# Patient Record
(demographics unavailable — no encounter records)

---

## 2025-07-02 NOTE — PROCEDURE
[Other: ____] : [unfilled] [Right] : of the right [Pain] : pain [Inflammation] : inflammation [Alcohol] : alcohol [Betadine] : betadine [Ethyl Chloride sprayed topically] : ethyl chloride sprayed topically [Effusion] : effusion [] : Patient tolerated procedure well [Call if redness, pain or fever occur] : call if redness, pain or fever occur [Apply ice for 15min out of every hour for the next 12-24 hours as tolerated] : apply ice for 15 minutes out of every hour for the next 12-24 hours as tolerated [Previous OTC use and PT nontherapeutic] : patient has tried OTC's including aspirin, Ibuprofen, Aleve, etc or prescription NSAIDS, and/or exercises at home and/or physical therapy without satisfactory response [Patient had decreased mobility in the joint] : patient had decreased mobility in the joint [Risks, benefits, alternatives discussed / Verbal consent obtained] : the risks benefits, and alternatives have been discussed, and verbal consent was obtained [de-identified] : 2ccs [de-identified] : blood

## 2025-07-02 NOTE — DISCUSSION/SUMMARY
[de-identified] : We discussed compression, avoiding leaning on the elbow, ice therapy and the role of NSAIDS for the pain. These modalities will improve the patient's functionality in their activities of daily life.  Risks, benefits and contraindications were discussed.  The patient will follow up in 4 weeks or sooner as needed.

## 2025-07-02 NOTE — HISTORY OF PRESENT ILLNESS
[de-identified] : Patient reports bursitis for the last 6 months that he has been treating with compression and Arnica.

## 2025-07-02 NOTE — PHYSICAL EXAM
[Normal Coordination] : normal coordination [Normal Sensation] : normal sensation [Normal Mood and Affect] : normal mood and affect [Oriented] : oriented [Able to Communicate] : able to communicate [Well Developed] : well developed [Well Nourished] : well nourished [NL (150)] : flexion 150 degrees [NL (0)] : extension 0 degrees [NL (90)] : supination 90 degrees [5___] : supination 5[unfilled]/5 [] : light touch intact [Right] : right elbow [Degenerative change] : Degenerative change [FreeTextEntry1] : olecranon spur

## 2025-07-30 NOTE — HISTORY OF PRESENT ILLNESS
[FreeTextEntry1] : 54 year-old male here for cv evaluation I initally saw in 2021 for primary prevention and HTN work up. He is overweight with mild RUSSELL.   8/2025 VISIT: refills needed. interim coronary calcium score 19  4/2024 VISIT: LDL stable 129. hypertension improved with weight loss and increased activity.   4/2023 VISIT: no angina. bp elevated. Labwork cbc/tft/ldl 124/a1c/cmp. TTE: preserved   11/2022 VISIT: blood pressures now increased but reports could be due to gout flare and not taking hydralazine as prescribed. mild weight gain. diet.   3/2022 VISIT:  2ndary evaluation work up was unremarkable as detailed. Treadmill stress test was stopped prematurely due to hypertensive response  .  Submaximal heart rate unable to interpret for ischemia.  He did not have symptoms. Blood pressure log kept at home with current medications show resting pressure average about 140 and diastolic in 90s.   **TESTING I REVIEWED TODAY excluding above:  4/2023 VISIT: Labwork cbc/tft/ldl 124.  3/2022 ETT: hypertensive response. sub max HR LABWORK: normal tft/cbc/cmp/a1c   12/2021 CAROTID DUPLEX: NO SIG DISEASE  Echocardiogram as an inpatient on May 17/21 showed preserved biventricular function with mild concentric left ventricular hypertrophy. No renal artery stenosis No adrenal mass Lab work 2/26/2021: Significant for grossly normal CBC, CMP.  .  ASCVD 5% 10-year. no renin/jose.

## 2025-07-30 NOTE — PHYSICAL EXAM
[Well Developed] : well developed [Well Nourished] : well nourished [No Acute Distress] : no acute distress [Normal Conjunctiva] : normal conjunctiva [Normal Venous Pressure] : normal venous pressure [Normal S1, S2] : normal S1, S2 [No Rub] : no rub [No Gallop] : no gallop [Clear Lung Fields] : clear lung fields [Good Air Entry] : good air entry [No Respiratory Distress] : no respiratory distress  [Soft] : abdomen soft [Normal Bowel Sounds] : normal bowel sounds [Tenderness] : tenderness [Normal Gait] : normal gait [No Edema] : no edema [No Cyanosis] : no cyanosis [No Clubbing] : no clubbing [No Varicosities] : no varicosities [No Rash] : no rash [No Skin Lesions] : no skin lesions [Moves all extremities] : moves all extremities [No Focal Deficits] : no focal deficits [Normal Speech] : normal speech [Alert and Oriented] : alert and oriented [Normal memory] : normal memory [de-identified] : 2+ pulses

## 2025-07-30 NOTE — DISCUSSION/SUMMARY
[FreeTextEntry1] : 54 year-old male here for cv evaluation I initally saw in 2021 for primary prevention and HTN work up. He is overweight with mild RUSSELL.   He has a moderate 10 year ASCVD 5% and mild coronary calcium score of 19.   LPa will help. Discussed statin initiation. not eating red meat  His secondary evaluation for HTN was unremarkable. He had no renal artery stenosis, no adrenal mass. normal renin/jose. Preserved biventricular function.  I brought up in person sleep study and pulm/sleep consult, will discuss more  return in OV 6 months with labs. ER precautions given to patient.

## 2025-07-30 NOTE — PHYSICAL EXAM
[Well Developed] : well developed [Well Nourished] : well nourished [No Acute Distress] : no acute distress [Normal Conjunctiva] : normal conjunctiva [Normal Venous Pressure] : normal venous pressure [Normal S1, S2] : normal S1, S2 [No Rub] : no rub [No Gallop] : no gallop [Clear Lung Fields] : clear lung fields [Good Air Entry] : good air entry [No Respiratory Distress] : no respiratory distress  [Soft] : abdomen soft [Normal Bowel Sounds] : normal bowel sounds [Tenderness] : tenderness [Normal Gait] : normal gait [No Edema] : no edema [No Cyanosis] : no cyanosis [No Clubbing] : no clubbing [No Varicosities] : no varicosities [No Rash] : no rash [No Skin Lesions] : no skin lesions [Moves all extremities] : moves all extremities [No Focal Deficits] : no focal deficits [Normal Speech] : normal speech [Alert and Oriented] : alert and oriented [Normal memory] : normal memory [de-identified] : 2+ pulses

## 2025-07-30 NOTE — REASON FOR VISIT
[Symptom and Test Evaluation] : symptom and test evaluation [CV Risk Factors and Non-Cardiac Disease] : CV risk factors and non-cardiac disease [Hypertension] : hypertension [FreeTextEntry3] : Dr. Peter Hernandez